# Patient Record
Sex: FEMALE | Race: WHITE | Employment: UNEMPLOYED | URBAN - METROPOLITAN AREA
[De-identification: names, ages, dates, MRNs, and addresses within clinical notes are randomized per-mention and may not be internally consistent; named-entity substitution may affect disease eponyms.]

---
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
## 2025-01-27 NOTE — TELEPHONE ENCOUNTER
Excerpt from patient's 7/19 first allergy injection appointment charting:  Patient was exhibiting signs of anxiety previous to receiving allergy injection.  Patient received allergy injection 340pm.  After receiving shot patient sat down on chair stating she was dizzy, then she couldn't see.  LPN on staff and writer assisted patient to lying position on floor, patient's mother was already sitting on floor, patient's head was laid on mother's lap as patient was laid down.  A pillow was found and placed on mothers lap under patient's head.  Patient's mother reported patient has a history of anxiety attacks and recently passed out at the family clinic when having her blood drawn.  Writer had LPN on staff call [facility], they were called by 345pm.  At 345pm patient's vitals were /63, 98% and 106 bpm LS clear  Patient was reporting she could not move had pale pallor to her face.    Patient was asymptomatic for allergic response.  At 355pm [facility] took control of patient, got a wheel chair and transported patient to ER.  Cholo Quevedo RN  Routed to Dr Oconnell for consideration and as FYI.   Unable to assess